# Patient Record
Sex: FEMALE | Race: WHITE | Employment: FULL TIME | URBAN - METROPOLITAN AREA
[De-identification: names, ages, dates, MRNs, and addresses within clinical notes are randomized per-mention and may not be internally consistent; named-entity substitution may affect disease eponyms.]

---

## 2017-03-01 PROCEDURE — 85652 RBC SED RATE AUTOMATED: CPT | Performed by: INTERNAL MEDICINE

## 2017-03-01 PROCEDURE — 80053 COMPREHEN METABOLIC PANEL: CPT | Performed by: INTERNAL MEDICINE

## 2017-03-01 PROCEDURE — 86140 C-REACTIVE PROTEIN: CPT | Performed by: INTERNAL MEDICINE

## 2017-03-01 PROCEDURE — 85025 COMPLETE CBC W/AUTO DIFF WBC: CPT | Performed by: INTERNAL MEDICINE

## 2017-03-01 PROCEDURE — 36415 COLL VENOUS BLD VENIPUNCTURE: CPT | Performed by: INTERNAL MEDICINE

## 2017-06-13 PROCEDURE — 86480 TB TEST CELL IMMUN MEASURE: CPT | Performed by: INTERNAL MEDICINE

## 2018-08-29 PROCEDURE — 86618 LYME DISEASE ANTIBODY: CPT | Performed by: OTHER

## 2018-08-29 PROCEDURE — 84425 ASSAY OF VITAMIN B-1: CPT | Performed by: OTHER

## 2018-08-29 PROCEDURE — 83921 ORGANIC ACID SINGLE QUANT: CPT | Performed by: OTHER

## 2019-04-01 PROCEDURE — 86480 TB TEST CELL IMMUN MEASURE: CPT | Performed by: INTERNAL MEDICINE

## 2019-04-01 PROCEDURE — 84156 ASSAY OF PROTEIN URINE: CPT | Performed by: INTERNAL MEDICINE

## 2019-05-08 PROBLEM — M23.92 INTERNAL DERANGEMENT OF KNEE, LEFT: Status: ACTIVE | Noted: 2019-05-08

## 2019-05-22 PROBLEM — S83.242D ACUTE MEDIAL MENISCAL TEAR, LEFT, SUBSEQUENT ENCOUNTER: Status: ACTIVE | Noted: 2019-05-22

## 2019-06-11 PROBLEM — Z98.890 S/P LEFT KNEE ARTHROSCOPY: Status: ACTIVE | Noted: 2019-06-11

## 2021-06-29 ENCOUNTER — HOSPITAL ENCOUNTER (OUTPATIENT)
Age: 56
Discharge: HOME | End: 2021-06-29
Payer: COMMERCIAL

## 2021-06-29 VITALS
RESPIRATION RATE: 16 BRPM | WEIGHT: 162 LBS | SYSTOLIC BLOOD PRESSURE: 156 MMHG | DIASTOLIC BLOOD PRESSURE: 71 MMHG | TEMPERATURE: 97 F | OXYGEN SATURATION: 100 % | BODY MASS INDEX: 30 KG/M2 | HEART RATE: 84 BPM

## 2021-06-29 DIAGNOSIS — S09.92XA INJURY OF NOSE, INITIAL ENCOUNTER: Primary | ICD-10-CM

## 2021-06-29 PROCEDURE — 99213 OFFICE O/P EST LOW 20 MIN: CPT | Performed by: NURSE PRACTITIONER

## 2021-06-29 NOTE — ED INITIAL ASSESSMENT (HPI)
Pt c/o nose pain, her dog jumping up and hitting her in the nose last night, bleediing for 30 minutes last night, stopped at present, denies blood thinners

## 2021-06-29 NOTE — ED PROVIDER NOTES
Patient Seen in: [facility]      History   Patient presents with:  Trauma    Stated Complaint: Nose Injury    HPI/Subjective:   29-year-old female who presents to the IC with complaints of a nasal injury last night.   Patient states her dog kel Smokeless tobacco: Never Used    Vaping Use      Vaping Use: Never used    Alcohol use: No      Alcohol/week: 0.0 standard drinks    Drug use: No             Review of Systems   Constitutional: Negative. HENT:        Nasal injury   Eyes: Negative.     Re normal.               ED Course   Labs Reviewed - No data to display  Did discuss x-ray possibility with patient patient declines at this time.            MDM   I have discussed with the patient and/or family the results of test, differential diagnosis, alejandro

## 2021-08-08 ENCOUNTER — HOSPITAL ENCOUNTER (EMERGENCY)
Age: 56
Discharge: HOME | End: 2021-08-08
Attending: EMERGENCY MEDICINE
Payer: COMMERCIAL

## 2021-08-08 ENCOUNTER — APPOINTMENT (OUTPATIENT)
Dept: GENERAL RADIOLOGY | Age: 56
End: 2021-08-08
Attending: EMERGENCY MEDICINE
Payer: COMMERCIAL

## 2021-08-08 VITALS
HEIGHT: 61 IN | DIASTOLIC BLOOD PRESSURE: 73 MMHG | OXYGEN SATURATION: 98 % | BODY MASS INDEX: 29.83 KG/M2 | HEART RATE: 68 BPM | SYSTOLIC BLOOD PRESSURE: 138 MMHG | WEIGHT: 158 LBS | TEMPERATURE: 98 F | RESPIRATION RATE: 16 BRPM

## 2021-08-08 DIAGNOSIS — M53.3 BACK PAIN, SACROILIAC: Primary | ICD-10-CM

## 2021-08-08 PROCEDURE — 99283 EMERGENCY DEPT VISIT LOW MDM: CPT

## 2021-08-08 PROCEDURE — 96372 THER/PROPH/DIAG INJ SC/IM: CPT

## 2021-08-08 PROCEDURE — 72110 X-RAY EXAM L-2 SPINE 4/>VWS: CPT | Performed by: EMERGENCY MEDICINE

## 2021-08-08 RX ORDER — CYCLOBENZAPRINE HCL 10 MG
10 TABLET ORAL 3 TIMES DAILY PRN
Qty: 20 TABLET | Refills: 0 | Status: SHIPPED | OUTPATIENT
Start: 2021-08-08 | End: 2021-08-15

## 2021-08-08 RX ORDER — METHYLPREDNISOLONE SODIUM SUCCINATE 125 MG/2ML
125 INJECTION, POWDER, LYOPHILIZED, FOR SOLUTION INTRAMUSCULAR; INTRAVENOUS ONCE
Status: COMPLETED | OUTPATIENT
Start: 2021-08-08 | End: 2021-08-08

## 2021-08-08 RX ORDER — CYCLOBENZAPRINE HCL 10 MG
10 TABLET ORAL ONCE
Status: COMPLETED | OUTPATIENT
Start: 2021-08-08 | End: 2021-08-08

## 2021-08-08 RX ORDER — TRAMADOL HYDROCHLORIDE 50 MG/1
100 TABLET ORAL ONCE
Status: COMPLETED | OUTPATIENT
Start: 2021-08-08 | End: 2021-08-08

## 2021-08-08 RX ORDER — TRAMADOL HYDROCHLORIDE 50 MG/1
TABLET ORAL EVERY 6 HOURS PRN
Qty: 10 TABLET | Refills: 0 | Status: SHIPPED | OUTPATIENT
Start: 2021-08-08 | End: 2021-08-15

## 2021-08-08 RX ORDER — METHYLPREDNISOLONE 4 MG/1
TABLET ORAL
Qty: 1 EACH | Refills: 0 | Status: SHIPPED | OUTPATIENT
Start: 2021-08-08

## 2021-08-08 NOTE — ED INITIAL ASSESSMENT (HPI)
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 No injury.

## 2021-08-08 NOTE — ED PROVIDER NOTES
Patient Seen in: THE [facility] In [place]      History   Patient presents with:  Back Pain    Stated Complaint: Lower back pain started yesterday.  Pain to move/walk    HPI/Subjective:   HPI    Patient complains of pain in the lower back at [address] [facility]                Social History    Tobacco Use      Smoking status: Never Smoker      Smokeless tobacco: Never Used    Vaping Use      Vaping Use: Never used    Alcohol use: No      Alcohol/week: 0.0 standard drinks    Drug use: N pain  Abdominal examination completely benign    Patient with multiple medication allergies.   She has tolerated tramadol in the past.  I will treat her with steroid and muscle relaxant    X-ray lumbar spine   BONES:  Preservation lumbar vertebral body heig

## 2022-04-17 ENCOUNTER — HOSPITAL ENCOUNTER (OUTPATIENT)
Age: 57
Discharge: HOME | End: 2022-04-17
Payer: COMMERCIAL

## 2022-04-17 VITALS
TEMPERATURE: 97 F | HEIGHT: 61 IN | DIASTOLIC BLOOD PRESSURE: 102 MMHG | SYSTOLIC BLOOD PRESSURE: 141 MMHG | RESPIRATION RATE: 16 BRPM | BODY MASS INDEX: 30.96 KG/M2 | WEIGHT: 164 LBS | OXYGEN SATURATION: 96 % | HEART RATE: 95 BPM

## 2022-04-17 DIAGNOSIS — T14.8XXA SKIN AVULSION: Primary | ICD-10-CM

## 2022-04-17 PROCEDURE — 99213 OFFICE O/P EST LOW 20 MIN: CPT | Performed by: NURSE PRACTITIONER

## 2022-10-23 ENCOUNTER — HOSPITAL ENCOUNTER (OUTPATIENT)
Age: 57
Discharge: HOME | End: 2022-10-23
Attending: NURSE PRACTITIONER
Payer: COMMERCIAL

## 2022-10-23 ENCOUNTER — APPOINTMENT (OUTPATIENT)
Dept: GENERAL RADIOLOGY | Age: 57
End: 2022-10-23
Attending: NURSE PRACTITIONER
Payer: COMMERCIAL

## 2022-10-23 VITALS
TEMPERATURE: 98 F | SYSTOLIC BLOOD PRESSURE: 136 MMHG | OXYGEN SATURATION: 97 % | DIASTOLIC BLOOD PRESSURE: 78 MMHG | RESPIRATION RATE: 16 BRPM | HEART RATE: 84 BPM

## 2022-10-23 DIAGNOSIS — S89.91XA INJURY OF RIGHT KNEE, INITIAL ENCOUNTER: Primary | ICD-10-CM

## 2022-10-23 PROCEDURE — 73560 X-RAY EXAM OF KNEE 1 OR 2: CPT | Performed by: NURSE PRACTITIONER

## 2022-10-23 PROCEDURE — A6449 LT COMPRES BAND >=3" <5"/YD: HCPCS | Performed by: NURSE PRACTITIONER

## 2022-10-23 PROCEDURE — 99213 OFFICE O/P EST LOW 20 MIN: CPT | Performed by: NURSE PRACTITIONER

## 2022-10-23 NOTE — ED INITIAL ASSESSMENT (HPI)
10/23 1030 Pt turned and felt a \"pop\" in her rigtht knee    Pt c/o pain to the lateral aspect, +swelling    Denies: numbness/tingling

## 2024-06-13 ENCOUNTER — HOSPITAL ENCOUNTER (OUTPATIENT)
Age: 59
Discharge: HOME | End: 2024-06-13
Payer: COMMERCIAL

## 2024-06-13 VITALS
TEMPERATURE: 97 F | RESPIRATION RATE: 16 BRPM | OXYGEN SATURATION: 98 % | SYSTOLIC BLOOD PRESSURE: 147 MMHG | DIASTOLIC BLOOD PRESSURE: 74 MMHG | HEART RATE: 95 BPM

## 2024-06-13 DIAGNOSIS — T23.252A PARTIAL THICKNESS BURN OF PALM OF LEFT HAND, INITIAL ENCOUNTER: Primary | ICD-10-CM

## 2024-06-13 PROCEDURE — 99213 OFFICE O/P EST LOW 20 MIN: CPT | Performed by: NURSE PRACTITIONER

## 2024-06-13 RX ORDER — IBUPROFEN 600 MG/1
600 TABLET ORAL ONCE
Status: COMPLETED | OUTPATIENT
Start: 2024-06-13 | End: 2024-06-13

## 2024-06-13 NOTE — ED PROVIDER NOTES
Patient Seen in: [facility]      History     Chief Complaint   Patient presents with    Burn     Stated Complaint: burn on hand    Subjective:   58-year-old female presents today with burn to the left hand.  States went to get something out of the oven and forgot to use and of admit.  Does have pain redness to the palm of the hand.  States hand felt like it was stuck to the hand.  Patient states cannot take tetanus due to allergies.  No other symptoms or concerns.  The patient's medication list, past medical history and social history elements as listed in today's nurse's notes were reviewed and agreed (except as otherwise stated in the HPI).  The patient's family history reviewed and determined to be noncontributory to the presenting problem            Objective:   Past Medical History:    ANEMIA    Iron deficiency - gets infusions    Arthritis    CANCER    Cervical cancer    Cancer (HCC)    Demyelinating disease (HCC)    Depression    Deviated septum    GERD    HYPOTHYROIDISM    Inflammatory arthritis    Migraines    OTHER DISEASES    Multiple medication allergies              Past Surgical History:   Procedure Laterality Date    Appendectomy  age 20    Benign biopsy right  2013    Cholecystectomy  age 42    laparscopically    Colonoscopy      Hysterectomy  age 29 in     ELLY/BSO for cervical cancer    Laparoscopic  07    Performed by PIOTR BULLOCK at [facility]      1991    x 1    Other surgical history      multiple laparoscopies for adhesions    Removal of tunneled cva device, with subq port or pump, central or peripheral insertion  07    Performed by PIOTR BULLOCK at [facility]                Social History     Socioeconomic History    Marital status:     Number of children: 1   Occupational History    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  Constitutional and vital signs reviewed.      All other systems reviewed and negative except as noted above.    Physical Exam     ED Triage Vitals [06/13/24 0932]   /74   Pulse 95   Resp 16   Temp 96.8 °F (36 °C)   Temp src Temporal   SpO2 98 %   O2 Device None (Room air)       Current Vitals:   Vital Signs  BP: 147/74  Pulse: 95  Resp: 16  Temp: 96.8 °F (36 °C)  Temp src: Temporal    Oxygen Therapy  SpO2: 98 %  O2 Device: None (Room air)            Physical Exam  Vitals and nursing note reviewed.   Constitutional:       Appearance: Normal appearance.   HENT:      Head: Normocephalic.      Mouth/Throat:      Mouth: Mucous membranes are moist.   Cardiovascular:      Rate and Rhythm: Normal rate.   Pulmonary:      Effort: Pulmonary effort is normal.   Skin:     General: Skin is warm and dry.      Comments: Erythema noted to the palm of the left hand.  No blistering noted at this time.  Does have mild swelling.   Neurological:      Mental Status: She is alert and oriented to person, place, and time.               ED Course   Labs Reviewed - No data to display                   MDM     Please note that this report has been produced using speech recognition software and may contain errors related to that system including, but not limited to, errors in grammar, punctuation, and spelling, as well as words and phrases that possibly may have been recognized inappropriately.  If there are any questions or concerns, contact the dictating provider for  clarification.        Note to patient: The 21st Century Cures Act makes medical notes like these available to patients in the interest of transparency. However, this is a medical document intended as peer to peer communication. It is written in medical language and may contain abbreviations or verbiage that are unfamiliar. It may appear blunt or direct. Medical documents are intended to carry relevant information, facts as evident, and the clinical opinion of the practitioner.                                   Medical Decision Making  Differential diagnosis includes but is not limited to: Burn, cellulitis      Presents today with history of burn this morning to the palm of the left hand.  Does have partial-thickness burn to the area.  Wound care was done here antibiotic ointment was applied with proper dressing.  Wound care instructions were given.  Patient states cannot take Tdap due to allergies.  To take over-the-counter ibuprofen for pain.  To follow-up with her primary care physician in 1 week for wound reevaluation.  Instructed go directly to the ER with any worsening of her symptoms.  Patient verbalized understanding agree with plan of care.    Risk  OTC drugs.        Disposition and Plan     Clinical Impression:  1. Partial thickness burn of palm of left hand, initial encounter         Disposition:  Discharge  6/13/2024  9:43 am    Follow-up:  Tommy Candelario MD  [address] [place] [address] [phone]    In 1 week  For wound re-check          Medications Prescribed:  Current Discharge Medication List

## 2024-06-13 NOTE — ED INITIAL ASSESSMENT (HPI)
Patient was at home making dinner for tonight before going to work and burnt her left hand. She grabbed the handle of a hot pot and burnt the palm of her hand. 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

## 2025-01-27 ENCOUNTER — HOSPITAL ENCOUNTER (OUTPATIENT)
Age: 60
Discharge: HOME | End: 2025-01-27
Payer: COMMERCIAL

## 2025-01-27 VITALS
WEIGHT: 160 LBS | DIASTOLIC BLOOD PRESSURE: 76 MMHG | SYSTOLIC BLOOD PRESSURE: 153 MMHG | TEMPERATURE: 98 F | BODY MASS INDEX: 30.21 KG/M2 | OXYGEN SATURATION: 98 % | HEART RATE: 80 BPM | HEIGHT: 61 IN | RESPIRATION RATE: 16 BRPM

## 2025-01-27 DIAGNOSIS — L25.9 CONTACT DERMATITIS, UNSPECIFIED CONTACT DERMATITIS TYPE, UNSPECIFIED TRIGGER: Primary | ICD-10-CM

## 2025-01-27 PROCEDURE — 99214 OFFICE O/P EST MOD 30 MIN: CPT | Performed by: NURSE PRACTITIONER

## 2025-01-27 RX ORDER — METHYLPREDNISOLONE 4 MG/1
TABLET ORAL
Qty: 1 EACH | Refills: 0 | Status: SHIPPED | OUTPATIENT
Start: 2025-01-27

## 2025-01-27 RX ORDER — FAMOTIDINE 20 MG/1
20 TABLET, FILM COATED ORAL DAILY
Qty: 7 TABLET | Refills: 0 | Status: SHIPPED | OUTPATIENT
Start: 2025-01-27 | End: 2025-02-03

## 2025-01-28 NOTE — ED INITIAL ASSESSMENT (HPI)
Pt sts full body itchy rash began yesterday. Denies SOB or swelling to face/throat. Denies new foods, meds, soaps etc.

## 2025-01-28 NOTE — ED PROVIDER NOTES
Patient Seen in: [facility]      History     Chief Complaint   Patient presents with    Rash Skin Problem     Stated Complaint: body rash    Subjective:   HPI      Patient is a 59-year-old female who is here today with complaints of a rash generalized to the torso, arms and legs.  Started yesterday.  She reports that now she has been scratching so much that they have started to bleed.  She denies any new environmental changes.  She denies any new foods, meds, soaps, shampoos, laundry detergents.      Objective:     Past Medical History:    ANEMIA    Iron deficiency - gets infusions    Arthritis    CANCER    Cervical cancer    Cancer (HCC)    Demyelinating disease (HCC)    Depression    Deviated septum    GERD    HYPOTHYROIDISM    Inflammatory arthritis    Migraines    OTHER DISEASES    Multiple medication allergies              Past Surgical History:   Procedure Laterality Date    Appendectomy  age 20    Benign biopsy right  2013    Cholecystectomy  age 42    laparscopically    Colonoscopy      Hysterectomy  age 29 in     ELLY/BSO for cervical cancer    Laparoscopic  07    Performed by PIOTR BULLOCK at [facility]      1991    x 1    Other surgical history      multiple laparoscopies for adhesions    Removal of tunneled cva device, with subq port or pump, central or peripheral insertion  07    Performed by PIOTR BULLOCK at [facility]                No pertinent social history.            Review of Systems    Positive for stated complaint: body rash  Other systems are as noted in HPI.  Constitutional and vital signs reviewed.      All other systems reviewed and negative except as noted above.    Physical Exam     ED Triage Vitals [25]   /76   Pulse 80   Resp 16   Temp 98.1 °F (36.7 °C)   Temp src Oral   SpO2 98 %   O2 Device None (Room air)       Current Vitals:   Vital Signs  BP: 153/76  Pulse: 80  Resp: 16  Temp: 98.1 °F (36.7  °C)  Temp src: Oral    Oxygen Therapy  SpO2: 98 %  O2 Device: None (Room air)        Physical Exam  VS: Vital signs reviewed. O2 saturation within normal limits for this patient     General: Patient is awake and alert, oriented to person, place and time. Not in acute distress.      HEENT: Head is normocephalic atraumatic. Pupils reactive bilaterally.  EOMs intact.  No facial droop or slurred speech.  No oral pallor. Mucous membranes moist.      Neck: No cervical lymphadenopathy. No stridor. Supple. No meningsmus.      Heart: S1-S2.  Regular rate and rhythm.       Lungs: good inspiratory effort. +air entry bilaterally without wheezes, rhonchi, crackles.  No accessory muscle use or tachypnea.       Abdomen: Soft, nontender, nondistended.  Active bowel sounds present.       Back: No CVA tenderness.     Extremities: No edema.  Pulses 2+ extremities.   Brisk capillary refill noted.      Skin: Normal skin turgor, patient has a macular, papular rash noted throughout the body.  They are scabbed over from the patient scratching with multiple abrasions noted especially over the back.  There is no surrounding erythema or signs of infection.  No drainage.    CNS: Moves all 4 extremities.  Interacts appropriately.  No tremor.  No gait abnormality          ED Course   Labs Reviewed - No data to display         I have personally  reviewed available prior medical records for any recent pertinent discharge summaries/testing. Patient/family updated on results and plan, a verbalized understanding and agreement with the plan.  I explained to the patient that emergent conditions may arise and to go to the ER for new, worsening or any persistent conditions. I've explained the importance of taking all medicatons as prescribed, follow up, and return precuations,  All questions answered.    Please note that this report has been produced using speech recognition software and may contain errors related to that system including, but not  limited to, errors in grammar, punctuation, and spelling, as well as words and phrases that possibly may have been recognized inappropriately.  If there are any questions or concerns, contact the dictating provider for clarification.       MDM      Sources of the medical history included the patient      Differential diagnosis before testing included contact dermatitis, bedbugs, scabies, viral process,     Patient is well-appearing, well-hydrated, well-nourished, nontoxic, there is no distress noted.  There is no signs of an infection at this time.  Patient has what appears to be a contact dermatitis.  I did discuss return follow-up cautions.  Patient discharged home with prednisone, Pepcid Kober to take Benadryl.  She was discharged home in stable condition to follow-up and to return to the emergency department with any worsening symptoms or concern    I have discussed the patient's results of testing, differential diagnosis and treatment plan, they expressed clear understanding of these instructions and agreed to the plan provided    The 21st Century Cures Act makes medical notes like these available to patients in the interest of transparency. Please be advised this is a medical document. Medical documents are intended to carry relevant information, facts as evident, and the clinical opinion of the practitioner. The medical note is intended as peer to peer communication and may appear blunt or direct. It is written in medical language and may contain abbreviations or verbiage that are unfamiliar.        Medical Decision Making      Disposition and Plan     Clinical Impression:  1. Contact dermatitis, unspecified contact dermatitis type, unspecified trigger         Disposition:  Discharge  1/27/2025  7:19 pm    Follow-up:  Tommy Candelario MD  [address] [place] [address]  [phone]                Medications Prescribed:  Discharge Medication List as of 1/27/2025  7:21 PM        START taking these  medications    Details   methylPREDNISolone (MEDROL) 4 MG Oral Tablet Therapy Pack Dosepack: take as directed, Normal, Disp-1 each, R-0      famotidine (PEPCID) 20 MG Oral Tab Take 1 tablet (20 mg total) by mouth daily for 7 days., Normal, Disp-7 tablet, R-0                 Supplementary Documentation:

## (undated) NOTE — LETTER
Date & Time: 4/17/2022, 11:27 AM  Patient: Venkat Lee  Encounter Provider(s):    AZALIA Pat       To Whom It May Concern:    Venkat Lee was seen and treated in our department on 4/17/2022. She should not return to work until 4/21/22.     If you have any questions or concerns, please do not hesitate to call.        _____________________________  Physician/APC Signature

## (undated) NOTE — LETTER
Date & Time: 6/13/2024, 9:43 AM  Patient: Belinda Alicea  Encounter Provider(s):    Gabriel Carter APRN       To Whom It May Concern:    Belinda Alicea was seen and treated in our department on 6/13/2024. She may return to work Monday, 6/17/2024.    If you have any questions or concerns, please do not hesitate to call.        _____________________________  Physician/APC Signature

## (undated) NOTE — LETTER
Date & Time: 10/23/2022, 2:35 PM  Patient: Marcus Richey  Encounter Provider(s):    AZALIA Colunga       To Whom It May Concern:    Marcus Richey was seen and treated in our department on 10/23/2022. She should not return to work until 10/25/22.     If you have any questions or concerns, please do not hesitate to call.        _____________________________  Physician/APC Signature